# Patient Record
Sex: MALE | Race: OTHER | HISPANIC OR LATINO | URBAN - METROPOLITAN AREA
[De-identification: names, ages, dates, MRNs, and addresses within clinical notes are randomized per-mention and may not be internally consistent; named-entity substitution may affect disease eponyms.]

---

## 2023-11-16 ENCOUNTER — EMERGENCY (EMERGENCY)
Facility: HOSPITAL | Age: 38
LOS: 1 days | Discharge: HOME | End: 2023-11-16
Attending: INTERNAL MEDICINE | Admitting: INTERNAL MEDICINE
Payer: SELF-PAY

## 2023-11-16 VITALS
RESPIRATION RATE: 16 BRPM | OXYGEN SATURATION: 98 % | DIASTOLIC BLOOD PRESSURE: 70 MMHG | TEMPERATURE: 99 F | SYSTOLIC BLOOD PRESSURE: 121 MMHG | HEART RATE: 73 BPM

## 2023-11-16 VITALS
DIASTOLIC BLOOD PRESSURE: 92 MMHG | RESPIRATION RATE: 17 BRPM | OXYGEN SATURATION: 99 % | HEART RATE: 66 BPM | HEIGHT: 69.29 IN | SYSTOLIC BLOOD PRESSURE: 145 MMHG | WEIGHT: 186.07 LBS | TEMPERATURE: 98 F

## 2023-11-16 PROCEDURE — 73590 X-RAY EXAM OF LOWER LEG: CPT | Mod: 26,LT

## 2023-11-16 PROCEDURE — 73590 X-RAY EXAM OF LOWER LEG: CPT

## 2023-11-16 PROCEDURE — 99283 EMERGENCY DEPT VISIT LOW MDM: CPT | Mod: 25

## 2023-11-16 PROCEDURE — 99284 EMERGENCY DEPT VISIT MOD MDM: CPT

## 2023-11-16 RX ORDER — IBUPROFEN 200 MG
600 TABLET ORAL ONCE
Refills: 0 | Status: COMPLETED | OUTPATIENT
Start: 2023-11-16 | End: 2023-11-16

## 2023-11-16 RX ADMIN — Medication 600 MILLIGRAM(S): at 22:30

## 2023-11-16 RX ADMIN — Medication 600 MILLIGRAM(S): at 21:32

## 2023-11-16 NOTE — ED PROVIDER NOTE - WR INTERPRETATION 1
No. JESS screening performed.  STOP BANG Legend: 0-2 = LOW Risk; 3-4 = INTERMEDIATE Risk; 5-8 = HIGH Risk MSK XR negative - No fracture, No dislocation, No foreign body

## 2023-11-16 NOTE — ED PROVIDER NOTE - MUSCULOSKELETAL, MLM
Spine appears normal, range of motion is not limited, no vert tenderness. mild tenderness to proximal aspect of left lower leg. no swelling or ecchymosis. no knee or ankle tenderness or swelling. full ROM of all ext

## 2023-11-16 NOTE — ED PROVIDER NOTE - NS ED ATTENDING STATEMENT MOD
I have seen and examined this patient and fully participated in the care of this patient as the teaching attending.  The service was shared with the JILLIAN.  I reviewed and verified the documentation and independently performed the documented:

## 2023-11-16 NOTE — ED PROVIDER NOTE - PATIENT PORTAL LINK FT
You can access the FollowMyHealth Patient Portal offered by [facility] by registering at the following website: [email or web address]. By joining [employer]’s FollowMyHealth portal, you will also be able to view your health information using other applications (apps) compatible with our system.

## 2023-11-16 NOTE — ED PROVIDER NOTE - DIFFERENTIAL DIAGNOSIS
Differentials include but not limited to sprain, strain, contusion, fracture.  Denies hitting head or LOC.  Do not suspect intracranial hemorrhage or skull fracture.  No vertebral tenderness to suggest vertebral fracture.  No right upper quadrant or left upper quadrant tenderness to suggest injury to liver or spleen Differential Diagnosis

## 2023-11-16 NOTE — ED PROVIDER NOTE - NSFOLLOWUPINSTRUCTIONS_ED_ALL_ED_FT
rest, ice, elevate  motrin over the counter for pain  follow up with primary care provider, referral provided if needed       Contusión  Contusion  Lucian contusión es un hematoma profundo. Es el resultado de lucian lesión que causa sangrado debajo de la piel. Los síntomas de hematoma incluyen dolor, hinchazón y cambio de color en la piel. La piel puede ponerse raghu, morada o amarilla.    Siga estas indicaciones en myles casa:  Control del dolor, la rigidez y la hinchazón    Bag of ice on a towel on the skin.  Puede usar RHCE. [place] significa:  Reposo.  Hielo.  Compresión, o ejercer presión sobre la jorge lesionada.  Elevar, o poner en alto la jorge lesionada.  Para seguir carlos método, monet lo siguiente:  Mantenga la jorge de la lesión en reposo.  Aplique hielo sobre la jorge lesionada si se lo indican. Para hacer esto:  Ponga el hielo en lucian bolsa plástica.  Coloque lucian toalla entre la piel y la bolsa.  Aplique el hielo enoch 20 minutos, 2 o 3 veces por día.  Si la piel se le pone de color garcia brillante, retire el hielo de inmediato para evitar daños en la piel. El riesgo de daño en la piel es mayor si no puede sentir dolor, calor o frío.  Si se lo indican, ejerza compresión en la jorge de la lesión con lucian venda elástica. Asegúrese de que la venda no esté muy ajustada. Si siente hormigueo o ha perdido la sensibilidad (adormecimiento) en la jorge, quítesela y vuelva a colocarla ruben se lo haya indicado el médico.  Si es posible, cuando esté sentado o acostado, eleve la jorge lesionada por encima del nivel del corazón.  Indicaciones generales    Use los medicamentos de venta kwame y los recetados solamente ruben se lo haya indicado el médico.  Concurra a todas las visitas de seguimiento. Es posible que myles médico desee toña cómo está recuperándose la contusión con el tratamiento.  Comuníquese con un médico si:  Los síntomas no mejoran después de varios días de tratamiento.  Candy síntomas empeoran.  Tiene dificultad para  la jorge de la lesión.  Solicite ayuda de inmediato si:  Siente mucho dolor.  Pierde la sensibilidad (adormecimiento) en lucian mano o un pie.  La mano o el pie están pálidos o fríos.  Esta información no tiene ruben fin reemplazar el consejo del médico. Asegúrese de hacerle al médico cualquier pregunta que tenga.
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
## 2023-11-16 NOTE — ED ADULT NURSE REASSESSMENT NOTE - NSFALLUNIVINTERV_ED_ALL_ED
Bed/Stretcher in lowest position, wheels locked, appropriate side rails in place/Call bell, personal items and telephone in reach/Instruct patient to call for assistance before getting out of bed/chair/stretcher/Non-slip footwear applied when patient is off stretcher/[place] to call system/Physically safe environment - no spills, clutter or unnecessary equipment/Purposeful proactive rounding/Room/bathroom lighting operational, light cord in reach
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
## 2023-11-16 NOTE — ED ADULT NURSE NOTE - NSFALLUNIVINTERV_ED_ALL_ED
Bed/Stretcher in lowest position, wheels locked, appropriate side rails in place/Call bell, personal items and telephone in reach/Instruct patient to call for assistance before getting out of bed/chair/stretcher/Non-slip footwear applied when patient is off stretcher/[place] to call system/Physically safe environment - no spills, clutter or unnecessary equipment/Purposeful proactive rounding/Room/bathroom lighting operational, light cord in reach

## 2023-11-16 NOTE — ED PROVIDER NOTE - CARE PROVIDER_API CALL
Dorina Aburto  Internal Medicine  [address] [place] [id number]  Phone: [phone]  Fax: [phone]  Follow Up Time: 1-3 Days

## 2023-11-16 NOTE — ED PROVIDER NOTE - PROGRESS NOTE DETAILS
Patient stable.  X-ray results discussed with patient.  No evidence of acute fracture.  Pain improved after Motrin.  Recommend continued over-the-counter Tylenol and Motrin for pain.  Referral to primary care doctor provided if needed
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
## 2024-02-01 ENCOUNTER — NON-APPOINTMENT (OUTPATIENT)
Age: 39
End: 2024-02-01

## 2024-02-01 PROBLEM — Z00.00 ENCOUNTER FOR PREVENTIVE HEALTH EXAMINATION: Status: ACTIVE | Noted: 2024-02-01

## 2025-01-02 NOTE — ED ADULT NURSE NOTE - CADM POA URETHRAL CATHETER
Elebiary, Yeon J  [facility]  ORTHOSURG [address]  Scheduled Appointment: 01/24/2025    Laurent Lindquist  [facility]  ORTHOSUR [address]  Scheduled Appointment: 03/17/2025     No